# Patient Record
Sex: FEMALE | Race: WHITE
[De-identification: names, ages, dates, MRNs, and addresses within clinical notes are randomized per-mention and may not be internally consistent; named-entity substitution may affect disease eponyms.]

---

## 2018-01-29 ENCOUNTER — HOSPITAL ENCOUNTER (INPATIENT)
Dept: HOSPITAL 14 - H.ER | Age: 28
LOS: 3 days | Discharge: HOME | DRG: 299 | End: 2018-02-01
Attending: INTERNAL MEDICINE | Admitting: INTERNAL MEDICINE
Payer: COMMERCIAL

## 2018-01-29 DIAGNOSIS — R74.0: ICD-10-CM

## 2018-01-29 DIAGNOSIS — I82.4Z2: ICD-10-CM

## 2018-01-29 DIAGNOSIS — D72.829: ICD-10-CM

## 2018-01-29 DIAGNOSIS — I27.20: ICD-10-CM

## 2018-01-29 DIAGNOSIS — I26.99: ICD-10-CM

## 2018-01-29 DIAGNOSIS — I82.432: Primary | ICD-10-CM

## 2018-01-29 DIAGNOSIS — F17.210: ICD-10-CM

## 2018-01-29 DIAGNOSIS — R74.8: ICD-10-CM

## 2018-01-29 DIAGNOSIS — T38.4X5A: ICD-10-CM
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
## 2018-01-29 DIAGNOSIS — Z23: ICD-10-CM

## 2018-01-29 DIAGNOSIS — S86.912A: ICD-10-CM

## 2018-01-29 LAB
ALBUMIN SERPL-MCNC: 4.2 G/DL (ref 3.5–5)
ALBUMIN/GLOB SERPL: 1.1 {RATIO} (ref 1–2.1)
ALT SERPL-CCNC: 118 U/L (ref 9–52)
APTT BLD: 26.2 SECONDS (ref 25.6–37.1)
AST SERPL-CCNC: 143 U/L (ref 14–36)
BASOPHILS # BLD AUTO: 0.1 K/UL (ref 0–0.2)
BASOPHILS NFR BLD: 0.5 % (ref 0–2)
BNP SERPL-MCNC: 1700 PG/ML (ref 0–450)
BUN SERPL-MCNC: 17 MG/DL (ref 7–17)
CALCIUM SERPL-MCNC: 9.6 MG/DL (ref 8.4–10.2)
EOSINOPHIL # BLD AUTO: 0.2 K/UL (ref 0–0.7)
EOSINOPHIL NFR BLD: 1.6 % (ref 0–4)
ERYTHROCYTE [DISTWIDTH] IN BLOOD BY AUTOMATED COUNT: 12.7 % (ref 11.5–14.5)
GFR NON-AFRICAN AMERICAN: 55
HGB BLD-MCNC: 15.1 G/DL (ref 12–16)
INR PPP: 1 (ref 0.9–1.2)
LYMPHOCYTES # BLD AUTO: 2 K/UL (ref 1–4.3)
LYMPHOCYTES NFR BLD AUTO: 14.5 % (ref 20–40)
MCH RBC QN AUTO: 32.2 PG (ref 27–31)
MCHC RBC AUTO-ENTMCNC: 33.6 G/DL (ref 33–37)
MCV RBC AUTO: 95.9 FL (ref 81–99)
MONOCYTES # BLD: 1.2 K/UL (ref 0–0.8)
MONOCYTES NFR BLD: 8.5 % (ref 0–10)
NEUTROPHILS # BLD: 10.3 K/UL (ref 1.8–7)
NEUTROPHILS NFR BLD AUTO: 74.9 % (ref 50–75)
NRBC BLD AUTO-RTO: 0.2 % (ref 0–0)
PLATELET # BLD: 201 K/UL (ref 130–400)
PMV BLD AUTO: 8.1 FL (ref 7.2–11.7)
PROTHROMBIN TIME: 11.3 SECONDS (ref 9.8–13.1)
RBC # BLD AUTO: 4.67 MIL/UL (ref 3.8–5.2)
TROPONIN I SERPL-MCNC: 0.09 NG/ML (ref 0–0.12)
WBC # BLD AUTO: 13.8 K/UL (ref 4.8–10.8)

## 2018-01-29 NOTE — CP.PCM.CON
History of Present Illness





- History of Present Illness


History of Present Illness: 


CC/Reason for ICU: PE with large clot burden





HPI: This is a 26 y/o female with no medical history who came into the ER with c

/o palpitations and SOB, as well as L calf pain. Patient states that she 

initially experienced calf pain about 2 weeks ago. At the time, went to a 

[facility] and was diagnosed with muscular strain and set up with orthopedic f/u. 

For the past two days the pain has become worse, and then the SOB/BAE and 

palpitations came on, and she went back to [facility]; she was sent from there to 

the ED for further w/u out of concern for DVT/PE. She was diagnosed with DVT 

and PE here, and was started on Lovenox. Because her clot burden is rather 

significant, she is being watched in the ICU. Patient with Hx of contraceptive 

use and smoking. Denies f/c/n/v/d. Denies CP at this time.





ROS: 14 systems reviewed, negative other than HPI





MHx/SHx: None





Allergies: None





Medications: oral contraceptive





Family Hx: No family history of DVT, PE





Social Hx: Lives with roommates, occ tobacco, occ EtOH





Past Patient History





- Past Social History


Alcohol: Occasional





- PSYCHIATRIC


Hx Substance Use: No





Meds


Allergies/Adverse Reactions: 


 Allergies











Allergy/AdvReac Type Severity Reaction Status Date / Time


 


No Known Allergies Allergy   Verified 01/29/18 20:27














Results





- Vital Signs


Recent Vital Signs: 


 Last Vital Signs











Temp  98.5 F   01/29/18 19:23


 


Pulse  109 H  01/29/18 22:59


 


Resp  18   01/29/18 22:59


 


BP  138/73   01/29/18 22:59


 


Pulse Ox  94 L  01/29/18 23:06














- Labs


Result Diagrams: 


 01/29/18 20:47





 01/29/18 20:47


Labs: 


 Laboratory Results - last 24 hr











  01/29/18 01/29/18 01/29/18





  20:47 20:47 20:47


 


WBC  13.8 H  


 


RBC  4.67  


 


Hgb  15.1  


 


Hct  44.8  


 


MCV  95.9  


 


MCH  32.2 H  


 


MCHC  33.6  


 


RDW  12.7  


 


Plt Count  201  


 


MPV  8.1  


 


Neut % (Auto)  74.9  


 


Lymph % (Auto)  14.5 L  


 


Mono % (Auto)  8.5  


 


Eos % (Auto)  1.6  


 


Baso % (Auto)  0.5  


 


Neut #  10.3 H  


 


Lymph #  2.0  


 


Mono #  1.2 H  


 


Eos #  0.2  


 


Baso #  0.1  


 


PT   


 


INR   


 


APTT   


 


D-Dimer, Quantitative    3969 H


 


Sodium   140 


 


Potassium   4.3 


 


Chloride   101 


 


Carbon Dioxide   27 


 


Anion Gap   16 


 


BUN   17 


 


Creatinine   1.2 


 


Est GFR ( Amer)   > 60 


 


Est GFR (Non-Af Amer)   55 


 


Random Glucose   101 


 


Calcium   9.6 


 


Total Bilirubin   0.6 


 


AST   143 H 


 


ALT   118 H 


 


Alkaline Phosphatase   74 


 


Troponin I   


 


NT-Pro-B Natriuret Pep   


 


Total Protein   8.1 


 


Albumin   4.2 


 


Globulin   3.9 


 


Albumin/Globulin Ratio   1.1 














  01/29/18 01/29/18





  22:15 22:44


 


WBC  


 


RBC  


 


Hgb  


 


Hct  


 


MCV  


 


MCH  


 


MCHC  


 


RDW  


 


Plt Count  


 


MPV  


 


Neut % (Auto)  


 


Lymph % (Auto)  


 


Mono % (Auto)  


 


Eos % (Auto)  


 


Baso % (Auto)  


 


Neut #  


 


Lymph #  


 


Mono #  


 


Eos #  


 


Baso #  


 


PT  11.3 


 


INR  1.0 


 


APTT  26.2 


 


D-Dimer, Quantitative  


 


Sodium  


 


Potassium  


 


Chloride  


 


Carbon Dioxide  


 


Anion Gap  


 


BUN  


 


Creatinine  


 


Est GFR ( Amer)  


 


Est GFR (Non-Af Amer)  


 


Random Glucose  


 


Calcium  


 


Total Bilirubin  


 


AST  


 


ALT  


 


Alkaline Phosphatase  


 


Troponin I   0.0860


 


NT-Pro-B Natriuret Pep   1700 H


 


Total Protein  


 


Albumin  


 


Globulin  


 


Albumin/Globulin Ratio  














- EKG Data


EKG Interpreted by: Myself


EKG shows normal: Sinus rhythm


Rate: Tachycardia





- EKG Data


EKG comments: 





CHRISS





- Imaging and Cardiology


  ** CT scan - chest


Status: Report reviewed by me (b/l PE;)





Assessment & Plan


(1) Pulmonary embolism


Assessment and Plan: 


26 y/o female with large PE and DVT in setting of OCP and smoking.


-Monitor in ICU overnight


-Continue Lovenox 1 mg/kg q12h


Status: Acute   





(2) Deep vein thrombosis (DVT)


Status: Acute

## 2018-01-29 NOTE — ED PDOC
HPI: Chest Pain


Time Seen by Provider: 01/29/18 20:16


Chief Complaint (Nursing): Palpitations


Chief Complaint (Provider): Palpitations/Left Calf Pain


History Per: Patient


History/Exam Limitations: no limitations


Onset/Duration Of Symptoms: Days (x2)


Current Symptoms Are (Timing): Still Present


Additional Complaint(s): 





Ernestina Peña is a 27 year old [place] female with no past medical history that 

presents to the ED with a chief complaint of palpitations, which she has been 

experiencing for the last two days, and left calf pain, which she has been 

experiencing for the past two weeks. Patient reports that when she initially 

began to experience her calf discomfort 2 weeks ago, she went to a [facility], 

where she states she was diagnosed with a muscular strain of the left calf and 

given a prescription for Naprosyn. She states that she went back to [facility] 

today for a reevaluation because she has now developed palpitations and 

shortness of breath, and her concern was that she might have developed a 

thromboembolic event. [facility] referred her to ED today. Patient denies any 

associated nausea, vomiting, diarrhea, or fever. 





Past Medical History


Reviewed: Historical Data, Nursing Documentation, Vital Signs


Vital Signs: 


 Last Vital Signs











Temp  98.8 F   01/30/18 00:23


 


Pulse  101 H  01/30/18 01:09


 


Resp  18   01/30/18 01:09


 


BP  123/70   01/30/18 00:23


 


Pulse Ox  98   01/30/18 00:23














- Medical History


PMH: No Chronic Diseases





- Surgical History


Surgical History: No Surg Hx





- Family History


Family History: States: Unknown Family Hx





- Social History


Current smoker - smoking cessation education provided: Yes (occasionally)


Alcohol: Occasional





- Home Medications


Home Medications: 


 Ambulatory Orders











 Medication  Instructions  Recorded


 


Ethinyl Estradiol/Drospirenone 1 each PO 01/30/18





[Ocella 3 mg-0.03 mg Tablet]  














- Allergies


Allergies/Adverse Reactions: 


 Allergies











Allergy/AdvReac Type Severity Reaction Status Date / Time


 


No Known Allergies Allergy   Verified 01/29/18 20:27














Review of Systems


Constitutional: Negative for: Fever


Cardiovascular: Positive for: Palpitations


Respiratory: Positive for: Shortness of Breath


Gastrointestinal: Negative for: Nausea, Vomiting, Diarrhea


Musculoskeletal: Positive for: Leg Pain (left calf pain )





Physical Exam





- Reviewed


Nursing Documentation Reviewed: Yes


Vital Signs Reviewed: Yes





- Physical Exam


Appears: Positive for: Non-toxic, No Acute Distress


Head Exam: Positive for: ATRAUMATIC, NORMOCEPHALIC


Skin: Positive for: Normal Color, Warm


Eye Exam: Positive for: Normal appearance, EOMI, PERRL


Neck: Positive for: Normal, Supple


Cardiovascular/Chest: Positive for: Regular Rate, Rhythm.  Negative for: Murmur


Respiratory: Positive for: Normal Breath Sounds.  Negative for: Wheezing


Gastrointestinal/Abdominal: Positive for: Normal Exam, Soft.  Negative for: 

Tenderness


Back: Positive for: Normal Inspection.  Negative for: L CVA Tenderness, R CVA 

Tenderness


Extremity: Positive for: Normal ROM.  Negative for: Tenderness, Pedal Edema, 

Calf Tenderness, Swelling, Other (Negative Jodie's sign left leg)


Neurologic/Psych: Positive for: Alert, Oriented.  Negative for: Motor/Sensory 

Deficits





- Laboratory Results


Result Diagrams: 


 01/29/18 20:47





 01/29/18 20:47





- ECG


O2 Sat by Pulse Oximetry: 94 (RA)


Pulse Ox Interpretation: Normal





- Critical Care


Total Time (In Min): 30





Medical Decision Making


Medical Decision Making: 





Impression: 27 year old [place] female with palpitations, shortness of breath, 

and left calf pain





Plan:


* US Duplex Left Lower Extremity


* EKG


* CMP


* CBC


* D-Dimer


* Urine dip


* Urine preg


* Reevaluation





US Duplex Left Lower Extremity


FINDINGS:


Deep veins: Acute DVT in the left popliteal and within a calf vein.


The remaining visualized deep veins of the left lower extremity are patent.


Superficial veins: Unremarkable. No thrombus in the visualized great saphenous 

vein.


Soft tissues: No acute findings. No popliteal cyst.


IMPRESSION:


Acute DVT in the left popliteal and within a calf vein.





CT Angio Chest ordered.





22:37


CT Angio Chest


FINDINGS:


Pulmonary arteries: Large pulmonary emboli and the left and right pulmonary 

arteries extending into


the segmental branches of all lobes.


Aorta: No acute findings. No thoracic aortic aneurysm.


Lungs: Unremarkable. No mass. No consolidation.


Pleural space: Unremarkable. No significant effusion. No pneumothorax.


Heart: Unremarkable. No cardiomegaly. No significant pericardial effusion. No 

evidence of RV


dysfunction.


Bones/joints: No acute fracture. No dislocation.


Soft tissues: Unremarkable.


Lymph nodes: Unremarkable. No enlarged lymph nodes.


IMPRESSION:


Large pulmonary emboli and the left and right pulmonary arteries extending into 

the


segmental branches of all lobes. Large clot burden. No CT evidence of right 

heart strain.





22:55


Ordered PT, PTT, BNP, troponin, and subcutaneous Lovenox. Spoke to Dr. Mcdermott, 

Pulmonary, and Dr. Gomez, Hematology. Patient will be admitted under the care 

of Dr. Comer. 





Both Dr. Mcdermott and Dr. Gomez agree to management plan. Dr. Gomez feels that 

due to patient's stable condition, benefits do not outweigh risks for TPA at 

this time. Dr. Gomez states that should the patient experience hemodynamic 

instability, he would reconsider. Dr. Mcdermott feels patient should be placed in 

ICU for close monitoring. Case discussed with Dr. Pollock, hospitalist. 





Clinical Diagnosis: DVT and PE


--------------------------------------------------------------------------------

----------------- 


Scribe Attestation:


Documented by Aida Rod, acting as a scribe for Nasim Reece MD.





Provider Scribe Attestation:


All medical record entries made by the Scribe were at my direction and 

personally dictated by me. I have reviewed the chart and agree that the record 

accurately reflects my personal performance of the history, physical exam, 

medical decision making, and the department course for this patient. I have 

also personally directed, reviewed, and agree with the discharge instructions 

and disposition.








Disposition





- Clinical Impression


Clinical Impression: 


 Pulmonary embolism, Deep vein thrombosis (DVT)








- Patient ED Disposition


Is Patient to be Admitted: Yes


Discussed With : Emma Pollock (Dr Mcdermott/Jason/Souleymane)


Counseled Patient/Family Regarding: Studies Performed, Diagnosis





- Disposition


Disposition Time: 22:55


Condition: GUARDED


Patient Signed Over To: Sunny Comer


Present On Arrival: Deep Vein Thrombosis / PE

## 2018-01-30 LAB
BUN SERPL-MCNC: 15 MG/DL (ref 7–17)
CALCIUM SERPL-MCNC: 9.3 MG/DL (ref 8.4–10.2)
ERYTHROCYTE [DISTWIDTH] IN BLOOD BY AUTOMATED COUNT: 12.5 % (ref 11.5–14.5)
GFR NON-AFRICAN AMERICAN: > 60
HGB BLD-MCNC: 14.1 G/DL (ref 12–16)
MCH RBC QN AUTO: 32.2 PG (ref 27–31)
MCHC RBC AUTO-ENTMCNC: 33.6 G/DL (ref 33–37)
MCV RBC AUTO: 95.8 FL (ref 81–99)
PLATELET # BLD: 199 K/UL (ref 130–400)
RBC # BLD AUTO: 4.38 MIL/UL (ref 3.8–5.2)
WBC # BLD AUTO: 12.9 K/UL (ref 4.8–10.8)

## 2018-01-30 PROCEDURE — 3E0234Z INTRODUCTION OF SERUM, TOXOID AND VACCINE INTO MUSCLE, PERCUTANEOUS APPROACH: ICD-10-PCS | Performed by: INTERNAL MEDICINE

## 2018-01-30 RX ADMIN — ENOXAPARIN SODIUM SCH MG: 80 INJECTION SUBCUTANEOUS at 21:38

## 2018-01-30 RX ADMIN — ENOXAPARIN SODIUM SCH MG: 80 INJECTION SUBCUTANEOUS at 09:24

## 2018-01-30 NOTE — CT
PROCEDURE:  CT Chest with contrast (Pulmonary Angiogram)



HISTORY:

Chest pain, r/o PE



COMPARISON:

None available. 



TECHNIQUE:

Axial computed tomography images were obtained of the chest in the 

pulmonary arterial phase of enhancement. Coronal and sagittal 

reformatted images were created and reviewed.



Intravenous contrast dose: 90 mL Visipaque 320



Radiation dose:



Total exam DLP = 361.79 mGy-cm.



This CT exam was performed using one or more of the following dose 

reduction techniques: Automated exposure control, adjustment of the 

mA and/or kV according to patient size, and/or use of iterative 

reconstruction technique.



FINDINGS:



PULMONARY ARTERIES:

There are large filling defects in the distal right and left 

pulmonary arteries extending to the lobar and segmental branches of 

all lobes. 



AORTA:

No aortic aneurysm or dissection. 



LUNGS:

The lungs are well inflated and clear. No nodule, mass or pulmonary 

consolidation. 



PLEURAL SPACES:

No pleural effusions or pneumothorax. 



HEART:

No cardiomegaly. No significant pericardial effusion. 



LYMPH NODES:

No pathologic lymphadenopathy.



BONES, CHEST WALL:

Within normal limits for the patient's age. No fracture or 

destructive lesion 



OTHER FINDINGS:

Unremarkable. 



IMPRESSION:

Acute large clot burden pulmonary embolism in the right and left 

pulmonary arteries extending into the lobar and segmental branches. 

No evidence of right ventricular strain. 



A preliminary report was provided by [facility].

## 2018-01-30 NOTE — CP.[email or web address]
Addendum entered and electronically signed by Dulce White MD  01/30/18 13:

53: 





Echo results : LVEF : 60-65% 


           Right ventricle mild to moderately dilated.


           Mild to moderate pulmonary hypertension.





Original Note:








<Dulce White - Last Filed: 01/30/18 11:12>





CCU Subjective





- Physician Review


Subjective (Free Text): 





01/30/18 11:13


Patient seen lying in bed, resting comfortably with some shortness of breath, 

but no distress. 


Some pain in her leg, with walking.


Sinus tachycardia on monitor: HR 100s, on O2 via NC. 


She has been on OCPs for about 6 months. 


She reports being occasional smoker, maybe up to 3 cigarettes per week. 


Very physically active, does crossfit regularly. 


No personal or family history of DVT/PE.











CCU Objective





- Vital Signs / Intake & Output


Vital Signs (Last 4 hours): 


Vital Signs











  Temp Pulse Resp BP Pulse Ox


 


 01/30/18 08:00  98.2 F  97 H  14  116/72  99











Intake and Output (Last 8hrs): 


 Intake & Output











 01/29/18 01/30/18 01/30/18





 22:59 06:59 14:59


 


Intake Total  0 


 


Balance  0 


 


Weight 70.307 kg  


 


Intake:   


 


  IV  0 














- Physical Exam


Head: Positive for: Atraumatic, Normocephalic


Pupils: Positive for: PERRL


Extroacular Muscles: Positive for: EOMI


Mouth: Positive for: Moist Mucous Membranes


Neck: Positive for: Normal Range of Motion


Respiratory/Chest: Positive for: Good Air Exchange.  Negative for: Respiratory 

Distress, Accessory Muscle Use


Cardiovascular: Positive for: Regular Rate and Rhythm, Tachycardic


Abdomen: Negative for: Tenderness, Distention, Normal Bowel Sounds


Upper Extremity: Positive for: Normal Inspection


Lower Extremity: Positive for: Normal Inspection.  Negative for: Edema


Neurological: Positive for: GCS=15, Speech Normal


Skin: Positive for: Warm, Dry, Normal Color.  Negative for: Rashes


Psychiatric: Positive for: Alert, Oriented x 3, Normal Insight, Normal 

Concentration





- Medications


Active Medications: 


Active Medications











Generic Name Dose Route Start Last Admin





  Trade Name Freq  PRN Reason Stop Dose Admin


 


Enoxaparin Sodium  70 mg  01/30/18 09:00  01/30/18 09:24





  Lovenox  SC   70 mg





  Q12 NNEKA   Administration





  Protocol   














- Patient Studies


Lab Studies: 


 Lab Studies











  01/30/18 01/30/18 01/29/18 Range/Units





  06:00 04:30 22:44 


 


WBC  12.9 H    (4.8-10.8)  K/uL


 


RBC  4.38    (3.80-5.20)  Mil/uL


 


Hgb  14.1    (12.0-16.0)  g/dL


 


Hct  42.0    (34.0-47.0)  %


 


MCV  95.8    (81.0-99.0)  fl


 


MCH  32.2 H    (27.0-31.0)  pg


 


MCHC  33.6    (33.0-37.0)  g/dL


 


RDW  12.5    (11.5-14.5)  %


 


Plt Count  199    (130-400)  K/uL


 


MPV     (7.2-11.7)  fl


 


Neut % (Auto)     (50.0-75.0)  %


 


Lymph % (Auto)     (20.0-40.0)  %


 


Mono % (Auto)     (0.0-10.0)  %


 


Eos % (Auto)     (0.0-4.0)  %


 


Baso % (Auto)     (0.0-2.0)  %


 


Neut #     (1.8-7.0)  K/uL


 


Lymph #     (1.0-4.3)  K/uL


 


Mono #     (0.0-0.8)  K/uL


 


Eos #     (0.0-0.7)  K/uL


 


Baso #     (0.0-0.2)  K/uL


 


PT     (9.8-13.1)  Seconds


 


INR     (0.9-1.2)  


 


APTT     (25.6-37.1)  Seconds


 


D-Dimer, Quantitative     (0-230)  ng/mlDDU


 


Sodium   139   (132-148)  mmol/l


 


Potassium   3.9   (3.6-5.0)  MMOL/L


 


Chloride   104   ()  mmol/L


 


Carbon Dioxide   25   (22-30)  mmol/L


 


Anion Gap   14   (10-20)  


 


BUN   15   (7-17)  mg/dl


 


Creatinine   1.1   (0.7-1.2)  mg/dl


 


Est GFR (African Amer)   > 60   


 


Est GFR (Non-Af Amer)   > 60   


 


Random Glucose   103   ()  mg/dL


 


Calcium   9.3   (8.4-10.2)  mg/dL


 


Total Bilirubin     (0.2-1.3)  mg/dl


 


AST     (14-36)  U/L


 


ALT     (9-52)  U/L


 


Alkaline Phosphatase     ()  U/L


 


Troponin I    0.0860  (0.00-0.120)  ng/mL


 


NT-Pro-B Natriuret Pep    1700 H  (0-450)  pg/ml


 


Total Protein     (6.3-8.2)  G/DL


 


Albumin     (3.5-5.0)  g/dL


 


Globulin     (2.2-3.9)  gm/dL


 


Albumin/Globulin Ratio     (1.0-2.1)  














  01/29/18 01/29/18 01/29/18 Range/Units





  22:15 20:47 20:47 


 


WBC     (4.8-10.8)  K/uL


 


RBC     (3.80-5.20)  Mil/uL


 


Hgb     (12.0-16.0)  g/dL


 


Hct     (34.0-47.0)  %


 


MCV     (81.0-99.0)  fl


 


MCH     (27.0-31.0)  pg


 


MCHC     (33.0-37.0)  g/dL


 


RDW     (11.5-14.5)  %


 


Plt Count     (130-400)  K/uL


 


MPV     (7.2-11.7)  fl


 


Neut % (Auto)     (50.0-75.0)  %


 


Lymph % (Auto)     (20.0-40.0)  %


 


Mono % (Auto)     (0.0-10.0)  %


 


Eos % (Auto)     (0.0-4.0)  %


 


Baso % (Auto)     (0.0-2.0)  %


 


Neut #     (1.8-7.0)  K/uL


 


Lymph #     (1.0-4.3)  K/uL


 


Mono #     (0.0-0.8)  K/uL


 


Eos #     (0.0-0.7)  K/uL


 


Baso #     (0.0-0.2)  K/uL


 


PT  11.3    (9.8-13.1)  Seconds


 


INR  1.0    (0.9-1.2)  


 


APTT  26.2    (25.6-37.1)  Seconds


 


D-Dimer, Quantitative   3969 H   (0-230)  ng/mlDDU


 


Sodium    140  (132-148)  mmol/l


 


Potassium    4.3  (3.6-5.0)  MMOL/L


 


Chloride    101  ()  mmol/L


 


Carbon Dioxide    27  (22-30)  mmol/L


 


Anion Gap    16  (10-20)  


 


BUN    17  (7-17)  mg/dl


 


Creatinine    1.2  (0.7-1.2)  mg/dl


 


Est GFR (African Amer)    > 60  


 


Est GFR (Non-Af Amer)    55  


 


Random Glucose    101  ()  mg/dL


 


Calcium    9.6  (8.4-10.2)  mg/dL


 


Total Bilirubin    0.6  (0.2-1.3)  mg/dl


 


AST    143 H  (14-36)  U/L


 


ALT    118 H  (9-52)  U/L


 


Alkaline Phosphatase    74  ()  U/L


 


Troponin I     (0.00-0.120)  ng/mL


 


NT-Pro-B Natriuret Pep     (0-450)  pg/ml


 


Total Protein    8.1  (6.3-8.2)  G/DL


 


Albumin    4.2  (3.5-5.0)  g/dL


 


Globulin    3.9  (2.2-3.9)  gm/dL


 


Albumin/Globulin Ratio    1.1  (1.0-2.1)  














  01/29/18 Range/Units





  20:47 


 


WBC  13.8 H  (4.8-10.8)  K/uL


 


RBC  4.67  (3.80-5.20)  Mil/uL


 


Hgb  15.1  (12.0-16.0)  g/dL


 


Hct  44.8  (34.0-47.0)  %


 


MCV  95.9  (81.0-99.0)  fl


 


MCH  32.2 H  (27.0-31.0)  pg


 


MCHC  33.6  (33.0-37.0)  g/dL


 


RDW  12.7  (11.5-14.5)  %


 


Plt Count  201  (130-400)  K/uL


 


MPV  8.1  (7.2-11.7)  fl


 


Neut % (Auto)  74.9  (50.0-75.0)  %


 


Lymph % (Auto)  14.5 L  (20.0-40.0)  %


 


Mono % (Auto)  8.5  (0.0-10.0)  %


 


Eos % (Auto)  1.6  (0.0-4.0)  %


 


Baso % (Auto)  0.5  (0.0-2.0)  %


 


Neut #  10.3 H  (1.8-7.0)  K/uL


 


Lymph #  2.0  (1.0-4.3)  K/uL


 


Mono #  1.2 H  (0.0-0.8)  K/uL


 


Eos #  0.2  (0.0-0.7)  K/uL


 


Baso #  0.1  (0.0-0.2)  K/uL


 


PT   (9.8-13.1)  Seconds


 


INR   (0.9-1.2)  


 


APTT   (25.6-37.1)  Seconds


 


D-Dimer, Quantitative   (0-230)  ng/mlDDU


 


Sodium   (132-148)  mmol/l


 


Potassium   (3.6-5.0)  MMOL/L


 


Chloride   ()  mmol/L


 


Carbon Dioxide   (22-30)  mmol/L


 


Anion Gap   (10-20)  


 


BUN   (7-17)  mg/dl


 


Creatinine   (0.7-1.2)  mg/dl


 


Est GFR (African Amer)   


 


Est GFR (Non-Af Amer)   


 


Random Glucose   ()  mg/dL


 


Calcium   (8.4-10.2)  mg/dL


 


Total Bilirubin   (0.2-1.3)  mg/dl


 


AST   (14-36)  U/L


 


ALT   (9-52)  U/L


 


Alkaline Phosphatase   ()  U/L


 


Troponin I   (0.00-0.120)  ng/mL


 


NT-Pro-B Natriuret Pep   (0-450)  pg/ml


 


Total Protein   (6.3-8.2)  G/DL


 


Albumin   (3.5-5.0)  g/dL


 


Globulin   (2.2-3.9)  gm/dL


 


Albumin/Globulin Ratio   (1.0-2.1)  








 Laboratory Results - last 24 hr











  01/29/18 01/29/18 01/29/18





  20:47 20:47 20:47


 


WBC  13.8 H  


 


RBC  4.67  


 


Hgb  15.1  


 


Hct  44.8  


 


MCV  95.9  


 


MCH  32.2 H  


 


MCHC  33.6  


 


RDW  12.7  


 


Plt Count  201  


 


MPV  8.1  


 


Neut % (Auto)  74.9  


 


Lymph % (Auto)  14.5 L  


 


Mono % (Auto)  8.5  


 


Eos % (Auto)  1.6  


 


Baso % (Auto)  0.5  


 


Neut #  10.3 H  


 


Lymph #  2.0  


 


Mono #  1.2 H  


 


Eos #  0.2  


 


Baso #  0.1  


 


PT   


 


INR   


 


APTT   


 


D-Dimer, Quantitative    3969 H


 


Sodium   140 


 


Potassium   4.3 


 


Chloride   101 


 


Carbon Dioxide   27 


 


Anion Gap   16 


 


BUN   17 


 


Creatinine   1.2 


 


Est GFR ( Amer)   > 60 


 


Est GFR (Non-Af Amer)   55 


 


Random Glucose   101 


 


Calcium   9.6 


 


Total Bilirubin   0.6 


 


AST   143 H 


 


ALT   118 H 


 


Alkaline Phosphatase   74 


 


Troponin I   


 


NT-Pro-B Natriuret Pep   


 


Total Protein   8.1 


 


Albumin   4.2 


 


Globulin   3.9 


 


Albumin/Globulin Ratio   1.1 














  01/29/18 01/29/18 01/30/18





  22:15 22:44 04:30


 


WBC   


 


RBC   


 


Hgb   


 


Hct   


 


MCV   


 


MCH   


 


MCHC   


 


RDW   


 


Plt Count   


 


MPV   


 


Neut % (Auto)   


 


Lymph % (Auto)   


 


Mono % (Auto)   


 


Eos % (Auto)   


 


Baso % (Auto)   


 


Neut #   


 


Lymph #   


 


Mono #   


 


Eos #   


 


Baso #   


 


PT  11.3  


 


INR  1.0  


 


APTT  26.2  


 


D-Dimer, Quantitative   


 


Sodium    139


 


Potassium    3.9


 


Chloride    104


 


Carbon Dioxide    25


 


Anion Gap    14


 


BUN    15


 


Creatinine    1.1


 


Est GFR ( Amer)    > 60


 


Est GFR (Non-Af Amer)    > 60


 


Random Glucose    103


 


Calcium    9.3


 


Total Bilirubin   


 


AST   


 


ALT   


 


Alkaline Phosphatase   


 


Troponin I   0.0860 


 


NT-Pro-B Natriuret Pep   1700 H 


 


Total Protein   


 


Albumin   


 


Globulin   


 


Albumin/Globulin Ratio   














  01/30/18





  06:00


 


WBC  12.9 H


 


RBC  4.38


 


Hgb  14.1


 


Hct  42.0


 


MCV  95.8


 


MCH  32.2 H


 


MCHC  33.6


 


RDW  12.5


 


Plt Count  199


 


MPV 


 


Neut % (Auto) 


 


Lymph % (Auto) 


 


Mono % (Auto) 


 


Eos % (Auto) 


 


Baso % (Auto) 


 


Neut # 


 


Lymph # 


 


Mono # 


 


Eos # 


 


Baso # 


 


PT 


 


INR 


 


APTT 


 


D-Dimer, Quantitative 


 


Sodium 


 


Potassium 


 


Chloride 


 


Carbon Dioxide 


 


Anion Gap 


 


BUN 


 


Creatinine 


 


Est GFR ( Amer) 


 


Est GFR (Non-Af Amer) 


 


Random Glucose 


 


Calcium 


 


Total Bilirubin 


 


AST 


 


ALT 


 


Alkaline Phosphatase 


 


Troponin I 


 


NT-Pro-B Natriuret Pep 


 


Total Protein 


 


Albumin 


 


Globulin 


 


Albumin/Globulin Ratio 











EKG/Cardiology Studies: 


Cardiology / EKG Studies





01/29/18 20:27


ELECTROCARDIOGRAM Stat 


   Comment: 


   Mode Of Transportation: PORTABLE


   Reason For Exam: CP














Critical Care Progress Note





- Nutrition


Nutrition: 


 Nutrition











 Category Date Time Status


 


 Regular Diet [DIET] Diets  01/29/18 Breakfast Active














Assessment/Plan





- Assessment and Plan (Free Text)


Assessment: 


27 year old female with no PMH admitted for DVT and multiple bilateral 

pulmonary emboli while on OCPs.


Patient without personal or family history of DVT/PE, needs further workup. 





Assessment:


1. Deep Venous Thrombosis with Bilateral Pulmonary Emobli in setting of OCP use 

and occasional tobacco use.


2.  Transaminitis, etiology unknown, ?hypercoaguable state





Plan: 


-ICU monitoring for large clot burden, O2 prn dyspnea/ destaturation.


-Therapeutic Anticoagulation with Lovenox 70mg q12 hrs


-Hypercoaguability workup ordered: protein C&S, antithrombin 3, factor V leiden

, prothrombin gene, antiphospholipid sdx, MTHFR


-Obtain echo to evaluate for right heart strain, troponins negative.


-Repeat CMP in AM, monitor LFTs


-Pulmonary and Hematology/Oncology consults





Case d/w intesivist.





Christopher PGY2





<Puma Paz V - Last Filed: 01/30/18 14:23>





CCU Subjective





- Physician Review


Events Since Last Encounter (Free Text): 





01/30/18 14:22


Patient is seen, examined at bedside. Case discussed in am rounds. agree with 

plan of care as detailed in resident's note





CCU Objective





- Vital Signs / Intake & Output


Vital Signs (Last 4 hours): 


Vital Signs











  Temp Pulse Resp BP Pulse Ox


 


 01/30/18 14:00   106 H  23  108/79  96


 


 01/30/18 12:00  97.4 F L  105 H  12  109/51 L  92 L











Intake and Output (Last 8hrs): 


 Intake & Output











 01/29/18 01/30/18 01/30/18





 22:59 06:59 14:59


 


Intake Total  0 


 


Balance  0 


 


Weight 155 lb  


 


Intake:   


 


  IV  0 














- Medications


Active Medications: 


Active Medications











Generic Name Dose Route Start Last Admin





  Trade Name Freq  PRN Reason Stop Dose Admin


 


Enoxaparin Sodium  70 mg  01/30/18 09:00  01/30/18 09:24





  Lovenox  SC   70 mg





  Q12 NNEKA   Administration





  Protocol   














- Patient Studies


Lab Studies: 


 Lab Studies











  01/30/18 01/30/18 01/29/18 Range/Units





  06:00 04:30 22:44 


 


WBC  12.9 H    (4.8-10.8)  K/uL


 


RBC  4.38    (3.80-5.20)  Mil/uL


 


Hgb  14.1    (12.0-16.0)  g/dL


 


Hct  42.0    (34.0-47.0)  %


 


MCV  95.8    (81.0-99.0)  fl


 


MCH  32.2 H    (27.0-31.0)  pg


 


MCHC  33.6    (33.0-37.0)  g/dL


 


RDW  12.5    (11.5-14.5)  %


 


Plt Count  199    (130-400)  K/uL


 


MPV     (7.2-11.7)  fl


 


Neut % (Auto)     (50.0-75.0)  %


 


Lymph % (Auto)     (20.0-40.0)  %


 


Mono % (Auto)     (0.0-10.0)  %


 


Eos % (Auto)     (0.0-4.0)  %


 


Baso % (Auto)     (0.0-2.0)  %


 


Neut #     (1.8-7.0)  K/uL


 


Lymph #     (1.0-4.3)  K/uL


 


Mono #     (0.0-0.8)  K/uL


 


Eos #     (0.0-0.7)  K/uL


 


Baso #     (0.0-0.2)  K/uL


 


PT     (9.8-13.1)  Seconds


 


INR     (0.9-1.2)  


 


APTT     (25.6-37.1)  Seconds


 


D-Dimer, Quantitative     (0-230)  ng/mlDDU


 


Sodium   139   (132-148)  mmol/l


 


Potassium   3.9   (3.6-5.0)  MMOL/L


 


Chloride   104   ()  mmol/L


 


Carbon Dioxide   25   (22-30)  mmol/L


 


Anion Gap   14   (10-20)  


 


BUN   15   (7-17)  mg/dl


 


Creatinine   1.1   (0.7-1.2)  mg/dl


 


Est GFR (African Amer)   > 60   


 


Est GFR (Non-Af Amer)   > 60   


 


Random Glucose   103   ()  mg/dL


 


Calcium   9.3   (8.4-10.2)  mg/dL


 


Total Bilirubin     (0.2-1.3)  mg/dl


 


AST     (14-36)  U/L


 


ALT     (9-52)  U/L


 


Alkaline Phosphatase     ()  U/L


 


Troponin I    0.0860  (0.00-0.120)  ng/mL


 


NT-Pro-B Natriuret Pep    1700 H  (0-450)  pg/ml


 


Total Protein     (6.3-8.2)  G/DL


 


Albumin     (3.5-5.0)  g/dL


 


Globulin     (2.2-3.9)  gm/dL


 


Albumin/Globulin Ratio     (1.0-2.1)  














  01/29/18 01/29/18 01/29/18 Range/Units





  22:15 20:47 20:47 


 


WBC     (4.8-10.8)  K/uL


 


RBC     (3.80-5.20)  Mil/uL


 


Hgb     (12.0-16.0)  g/dL


 


Hct     (34.0-47.0)  %


 


MCV     (81.0-99.0)  fl


 


MCH     (27.0-31.0)  pg


 


MCHC     (33.0-37.0)  g/dL


 


RDW     (11.5-14.5)  %


 


Plt Count     (130-400)  K/uL


 


MPV     (7.2-11.7)  fl


 


Neut % (Auto)     (50.0-75.0)  %


 


Lymph % (Auto)     (20.0-40.0)  %


 


Mono % (Auto)     (0.0-10.0)  %


 


Eos % (Auto)     (0.0-4.0)  %


 


Baso % (Auto)     (0.0-2.0)  %


 


Neut #     (1.8-7.0)  K/uL


 


Lymph #     (1.0-4.3)  K/uL


 


Mono #     (0.0-0.8)  K/uL


 


Eos #     (0.0-0.7)  K/uL


 


Baso #     (0.0-0.2)  K/uL


 


PT  11.3    (9.8-13.1)  Seconds


 


INR  1.0    (0.9-1.2)  


 


APTT  26.2    (25.6-37.1)  Seconds


 


D-Dimer, Quantitative   3969 H   (0-230)  ng/mlDDU


 


Sodium    140  (132-148)  mmol/l


 


Potassium    4.3  (3.6-5.0)  MMOL/L


 


Chloride    101  ()  mmol/L


 


Carbon Dioxide    27  (22-30)  mmol/L


 


Anion Gap    16  (10-20)  


 


BUN    17  (7-17)  mg/dl


 


Creatinine    1.2  (0.7-1.2)  mg/dl


 


Est GFR (African Amer)    > 60  


 


Est GFR (Non-Af Amer)    55  


 


Random Glucose    101  ()  mg/dL


 


Calcium    9.6  (8.4-10.2)  mg/dL


 


Total Bilirubin    0.6  (0.2-1.3)  mg/dl


 


AST    143 H  (14-36)  U/L


 


ALT    118 H  (9-52)  U/L


 


Alkaline Phosphatase    74  ()  U/L


 


Troponin I     (0.00-0.120)  ng/mL


 


NT-Pro-B Natriuret Pep     (0-450)  pg/ml


 


Total Protein    8.1  (6.3-8.2)  G/DL


 


Albumin    4.2  (3.5-5.0)  g/dL


 


Globulin    3.9  (2.2-3.9)  gm/dL


 


Albumin/Globulin Ratio    1.1  (1.0-2.1)  














  01/29/18 Range/Units





  20:47 


 


WBC  13.8 H  (4.8-10.8)  K/uL


 


RBC  4.67  (3.80-5.20)  Mil/uL


 


Hgb  15.1  (12.0-16.0)  g/dL


 


Hct  44.8  (34.0-47.0)  %


 


MCV  95.9  (81.0-99.0)  fl


 


MCH  32.2 H  (27.0-31.0)  pg


 


MCHC  33.6  (33.0-37.0)  g/dL


 


RDW  12.7  (11.5-14.5)  %


 


Plt Count  201  (130-400)  K/uL


 


MPV  8.1  (7.2-11.7)  fl


 


Neut % (Auto)  74.9  (50.0-75.0)  %


 


Lymph % (Auto)  14.5 L  (20.0-40.0)  %


 


Mono % (Auto)  8.5  (0.0-10.0)  %


 


Eos % (Auto)  1.6  (0.0-4.0)  %


 


Baso % (Auto)  0.5  (0.0-2.0)  %


 


Neut #  10.3 H  (1.8-7.0)  K/uL


 


Lymph #  2.0  (1.0-4.3)  K/uL


 


Mono #  1.2 H  (0.0-0.8)  K/uL


 


Eos #  0.2  (0.0-0.7)  K/uL


 


Baso #  0.1  (0.0-0.2)  K/uL


 


PT   (9.8-13.1)  Seconds


 


INR   (0.9-1.2)  


 


APTT   (25.6-37.1)  Seconds


 


D-Dimer, Quantitative   (0-230)  ng/mlDDU


 


Sodium   (132-148)  mmol/l


 


Potassium   (3.6-5.0)  MMOL/L


 


Chloride   ()  mmol/L


 


Carbon Dioxide   (22-30)  mmol/L


 


Anion Gap   (10-20)  


 


BUN   (7-17)  mg/dl


 


Creatinine   (0.7-1.2)  mg/dl


 


Est GFR (African Amer)   


 


Est GFR (Non-Af Amer)   


 


Random Glucose   ()  mg/dL


 


Calcium   (8.4-10.2)  mg/dL


 


Total Bilirubin   (0.2-1.3)  mg/dl


 


AST   (14-36)  U/L


 


ALT   (9-52)  U/L


 


Alkaline Phosphatase   ()  U/L


 


Troponin I   (0.00-0.120)  ng/mL


 


NT-Pro-B Natriuret Pep   (0-450)  pg/ml


 


Total Protein   (6.3-8.2)  G/DL


 


Albumin   (3.5-5.0)  g/dL


 


Globulin   (2.2-3.9)  gm/dL


 


Albumin/Globulin Ratio   (1.0-2.1)  








 Laboratory Results - last 24 hr











  01/29/18 01/29/18 01/29/18





  20:47 20:47 20:47


 


WBC  13.8 H  


 


RBC  4.67  


 


Hgb  15.1  


 


Hct  44.8  


 


MCV  95.9  


 


MCH  32.2 H  


 


MCHC  33.6  


 


RDW  12.7  


 


Plt Count  201  


 


MPV  8.1  


 


Neut % (Auto)  74.9  


 


Lymph % (Auto)  14.5 L  


 


Mono % (Auto)  8.5  


 


Eos % (Auto)  1.6  


 


Baso % (Auto)  0.5  


 


Neut #  10.3 H  


 


Lymph #  2.0  


 


Mono #  1.2 H  


 


Eos #  0.2  


 


Baso #  0.1  


 


PT   


 


INR   


 


APTT   


 


D-Dimer, Quantitative    3969 H


 


Sodium   140 


 


Potassium   4.3 


 


Chloride   101 


 


Carbon Dioxide   27 


 


Anion Gap   16 


 


BUN   17 


 


Creatinine   1.2 


 


Est GFR ( Amer)   > 60 


 


Est GFR (Non-Af Amer)   55 


 


Random Glucose   101 


 


Calcium   9.6 


 


Total Bilirubin   0.6 


 


AST   143 H 


 


ALT   118 H 


 


Alkaline Phosphatase   74 


 


Troponin I   


 


NT-Pro-B Natriuret Pep   


 


Total Protein   8.1 


 


Albumin   4.2 


 


Globulin   3.9 


 


Albumin/Globulin Ratio   1.1 














  01/29/18 01/29/18 01/30/18





  22:15 22:44 04:30


 


WBC   


 


RBC   


 


Hgb   


 


Hct   


 


MCV   


 


MCH   


 


MCHC   


 


RDW   


 


Plt Count   


 


MPV   


 


Neut % (Auto)   


 


Lymph % (Auto)   


 


Mono % (Auto)   


 


Eos % (Auto)   


 


Baso % (Auto)   


 


Neut #   


 


Lymph #   


 


Mono #   


 


Eos #   


 


Baso #   


 


PT  11.3  


 


INR  1.0  


 


APTT  26.2  


 


D-Dimer, Quantitative   


 


Sodium    139


 


Potassium    3.9


 


Chloride    104


 


Carbon Dioxide    25


 


Anion Gap    14


 


BUN    15


 


Creatinine    1.1


 


Est GFR ( Amer)    > 60


 


Est GFR (Non-Af Amer)    > 60


 


Random Glucose    103


 


Calcium    9.3


 


Total Bilirubin   


 


AST   


 


ALT   


 


Alkaline Phosphatase   


 


Troponin I   0.0860 


 


NT-Pro-B Natriuret Pep   1700 H 


 


Total Protein   


 


Albumin   


 


Globulin   


 


Albumin/Globulin Ratio   














  01/30/18





  06:00


 


WBC  12.9 H


 


RBC  4.38


 


Hgb  14.1


 


Hct  42.0


 


MCV  95.8


 


MCH  32.2 H


 


MCHC  33.6


 


RDW  12.5


 


Plt Count  199


 


MPV 


 


Neut % (Auto) 


 


Lymph % (Auto) 


 


Mono % (Auto) 


 


Eos % (Auto) 


 


Baso % (Auto) 


 


Neut # 


 


Lymph # 


 


Mono # 


 


Eos # 


 


Baso # 


 


PT 


 


INR 


 


APTT 


 


D-Dimer, Quantitative 


 


Sodium 


 


Potassium 


 


Chloride 


 


Carbon Dioxide 


 


Anion Gap 


 


BUN 


 


Creatinine 


 


Est GFR ( Amer) 


 


Est GFR (Non-Af Amer) 


 


Random Glucose 


 


Calcium 


 


Total Bilirubin 


 


AST 


 


ALT 


 


Alkaline Phosphatase 


 


Troponin I 


 


NT-Pro-B Natriuret Pep 


 


Total Protein 


 


Albumin 


 


Globulin 


 


Albumin/Globulin Ratio 











EKG/Cardiology Studies: 


Cardiology / EKG Studies





01/29/18 20:27


ELECTROCARDIOGRAM Stat 


   Comment: 


   Mode Of Transportation: PORTABLE


   Reason For Exam: CP














Critical Care Progress Note





- Nutrition


Nutrition: 


 Nutrition











 Category Date Time Status


 


 Regular Diet [DIET] Diets  01/29/18 Breakfast Active

## 2018-01-30 NOTE — RAD
HISTORY:

Chest pain.



COMPARISON:

January 29, 2018. CT angiogram documenting bilateral pulmonary 

emboli. 



FINDINGS:



LUNGS:

No active pulmonary disease.



PLEURA:

No significant pleural effusion identified, no pneumothorax apparent.



CARDIOVASCULAR:

Normal.



OSSEOUS STRUCTURES:

No significant abnormalities.



VISUALIZED UPPER ABDOMEN:

Normal.



OTHER FINDINGS:

None.



IMPRESSION:

No active disease.

## 2018-01-30 NOTE — HP
CHIEF COMPLAINT:  Shortness of breath.



HISTORY OF PRESENT ILLNESS:  This is a 27-year-old female without

significant past medical history, who was on birth control pill, was

feeling bad and was having leg pain.  _____ the patient went to see the MD

where the patient was treated for muscle cramps.  The patient did not

improve and started having palpitation and shortness of breath, so the

patient was sent back to see the MD and was sent to the emergency room for

further management.  While in the emergency room, after workup, the patient

was found to have pulmonary embolism and was admitted for further

management.



REVIEW OF SYSTEMS:  Positive for leg pain, palpitation and shortness of

breath.  Review of systems, otherwise, is negative for headache, dizziness,

syncope, loss of consciousness, nausea, vomiting, diarrhea, constipation. 

Review of systems of all other organ system is unremarkable.



PAST MEDICAL HISTORY:  Unremarkable.



PAST SURGICAL HISTORY:  Unremarkable.



PERSONAL HISTORY:  The patient is currently nonsmoker, nondrinker.  No

substance abuse.



MEDICATIONS:  The patient is on birth control pill, Ocella.



ALLERGIES:  THE PATIENT IS NOT ALLERGIC TO ANY MEDICATION.



FAMILY HISTORY:  Noncontributory.



PHYSICAL EXAMINATION:

GENERAL:  Well-built, well-nourished, slightly overweight 27-year-old young

female, in no acute distress.

VITAL SIGNS:  Temperature 99.3, pulse 100, respirations 16, blood pressure

110/82.

HEENT:  Pupils reacting to light.  No JVD.  No thyromegaly.  No

lymphadenopathy.  No nystagmus.  Normocephalic, atraumatic skull.

HEART:  S1, S2 normal and regular.  No significant murmur, gallop, or rub

is heard.

LUNGS:  Show good bilateral air exchange.  No rales or rhonchi.

ABDOMEN:  Soft, nontender.  No organomegaly.  No fluid.  Bowel sounds are

plus and normal.

EXTREMITIES:  The patient has left lower extremity slightly enlarged, but

no calf swelling.  No tenderness.  No acute ischemia.

CENTRAL NERVOUS SYSTEM:  The patient is alert, awake, oriented x3.  There

is no sign of any acute gross focal motor or sensory neurological deficit.



DIAGNOSTIC DATA:  Available diagnostic data reviewed.  Telemetry monitoring

does not reveal significant arrhythmias.  WBC 13.8, hemoglobin 15.1,

hematocrit 44.8, platelets 201,000.  D-dimer is 3969.  Sodium _____,

potassium 4.2, chloride 101, bicarb 27, BUN 17, creatinine 1.2.  AST is

143, ALT is 118.  Troponin level is 0.08.  ProBNP level is 1700.  CTA is

positive for pulmonary embolism.  Left _____ ultrasound is positive for

DVT.



ADMITTING IMPRESSION:  Pulmonary embolism, left leg deep venous thrombosis,

elevated liver enzymes.



PLAN:  As ordered.



Case and plan discussed with the patient.











__________________________________________

Sunny Comer MD





DD:  01/30/2018 6:39:48

DT:  01/30/2018 7:49:18

Job # [id number]

## 2018-01-30 NOTE — CARD
--------------- APPROVED REPORT --------------





EXAM: Two-dimensional and M-mode echocardiogram with Doppler and 

color Doppler.



Other Information 

Quality : GoodRhythm : Tachycardia



INDICATION

Pulmonary Embolism 



2D DIMENSIONS 

IVSd1.57   (0.7-1.1cm)LVDd3.40   (3.9-5.9cm)

LVOT Diameter2.59   (1.8-2.4cm)PWd0.75   (0.7-1.1cm)

IVSs1.70   (0.8-1.2cm)LVDs2.55   (2.5-4.0cm)

FS (%) 24.9   %PWs1.34   (0.8-1.2cm)



M-Mode DIMENSIONS 

Left Atrium (MM)3.03   (2.5-4.0cm)IVSd1.49   (0.7-1.1cm)

Aortic Root2.92   (2.2-3.7cm)LVDd3.75   (4.0-5.6cm)

Aortic Cusp Exc.1.82   (1.5-2.0cm)PWd1.13   (0.7-1.1cm)

IVSs1.76   cmFS (%) 45   %

LVDs2.07   (2.0-3.8cm)PWs1.68   cm



Mitral Valve

E/A ratio0.0



TDI

E/Lateral E'0.0E/Medial E'0.0



Pulmonary Valve

PV Peak Hecbodub57.7cm/s



Tricuspid Valve

TR Peak Adonovlm014fl/sRAP CRNVTGAD99bcBuIA Peak Gr.33mmHg

[id number]



 LEFT VENTRICLE 

The left ventricle is normal size.

There is normal left ventricular wall thickness.

Left ventricle systolic function is normal.

The Ejection Fraction is 60-65%.

There is normal LV segmental wall motion.

Transmitral Doppler flow pattern is Grade I-abnormal relaxation 

pattern.



 RIGHT VENTRICLE 

The right ventricle is mildly to moderately dilated.

There is normal right ventricular wall thickness.

The right ventricular systolic function is normal.



 ATRIA 

The left atrium size is normal.

The right atrium size is normal.



 AORTIC VALVE 

The aortic valve is normal in structure.

No aortic regurgitation is present.

There is no aortic valvular stenosis. 



 MITRAL VALVE 

The mitral valve is normal in structure.

There is no evidence of mitral valve prolapse.

There is no mitral valve stenosis.

There is no mitral valve regurgitation noted.



 TRICUSPID VALVE 

The tricuspid valve is normal in structure.

There is mild tricuspid regurgitation.

Right ventricular systolic pressure is estimated at 42 mmHg. 

There is mild-moderate pulmonary hypertension.



 PULMONIC VALVE 

The pulmonary valve is normal in structure.

There is no pulmonic valvular regurgitation. 



 GREAT VESSELS 

The aortic root is normal in size.

The IVC is normal in size and collapses >50% with inspiration.



 PERICARDIAL EFFUSION 

The pericardium appears normal.



<Conclusion>

The left ventricle is normal size.

There is normal left ventricular wall thickness.

There is normal LV segmental wall motion.

Left ventricle systolic function is normal.

The Ejection Fraction is 60-65%.

Transmitral Doppler flow pattern is Grade I-abnormal relaxation 

pattern.

Right ventricular systolic pressure is estimated at 42 mmHg. 

There is mild-moderate pulmonary hypertension.

## 2018-01-30 NOTE — US
PROCEDURE:  Left lower extremity venous duplex Doppler. 



HISTORY:

calf pain







COMPARISON:

None available.



TECHNIQUE:

Common femoral, superficial femoral, popliteal and posterior tibial 

veins were evaluated. Flow was assessed with color Doppler, 

compressibility, assessment of phasic flow and augmentation response. 



FINDINGS:



COMMON FEMORAL VEIN:

Unremarkable.



SUPERFICIAL FEMORAL VEIN:

Unremarkable.



POPLITEAL VEIN:

Intraluminal echogenic material, incompressibility and lack of 

Doppler flow.



POSTERIOR TIBIAL VEIN:

Unremarkable.



OTHER FINDINGS:

Intraluminal echogenic material, incompressibility and lack of 

Doppler flow involving a calf vein.



IMPRESSION:

Occlusive deep venous thrombosis in the left popliteal and within a 

calf vein.

## 2018-01-30 NOTE — CARD
--------------- APPROVED REPORT --------------





EKG Measurement

Heart [id number]

MN [id number]

[id number]

[id number]

[id number]



<Conclusion>

Sinus tachycardia

Right atrial enlargement

Borderline ECG

## 2018-01-30 NOTE — CP.PCM.CON
History of Present Illness





- History of Present Illness


History of Present Illness: 





27 year old female with no past medical history, presented with shortness of 

breath and calf pain, found to have PE and DVT.  The patient reports to calf 

pain for about 2 weeks which she attributed to soreness from exercise.  She 

then began to experience shortness of breath and palpitations with exertion.  

She went to an urgent care center and was instructed to come to the hospital.  

In the ER a CT angio of the chest revealed PE and venous duplex of the LE 

revealed a left popliteal and calf DVT.  She was started on therapeutic Lovenox 

and sent to ICU for close monitoring.  She denies immobility and exercises 

regularly.  She does admit to oral contraceptive pill use.





Past medical history:  None





Past surgical history:  None





Family history:  Mom had to take unknown injection during pregnancy





Social history:  3 cigs daily, social ETOH, denies illicit drug use.





Allergies:  NKA





Review of systems:  All remaining review of systems including HEENT, 

cardiovacular, respiratory, gastrointestinal, genitourinary, musculoskeletal, 

dermatologic, neurologic, and psychiatric are negative unless mentioned in the 

HPI.





Past Patient History





- Past Medical History & Family History


Past Medical History?: No





- Past Social History


Alcohol: Occasional





- MUSCULOSKELETAL/RHEUMATOLOGICAL


Hx Falls: No





- PSYCHIATRIC


Hx Substance Use: No





- SURGICAL HISTORY


Hx Surgeries: No





- ANESTHESIA


Hx Anesthesia: No





Meds


Allergies/Adverse Reactions: 


 Allergies











Allergy/AdvReac Type Severity Reaction Status Date / Time


 


No Known Allergies Allergy   Verified 01/29/18 20:27














- Medications


Medications: 


 Current Medications





Enoxaparin Sodium (Lovenox)  70 mg SC Q12 NNEKA


   PRN Reason: Protocol


   Last Admin: 01/30/18 09:24 Dose:  70 mg











Physical Exam





- Head Exam


Head Exam: ATRAUMATIC





- Eye Exam


Eye Exam: Normal appearance





- ENT Exam


ENT Exam: Mucous Membranes Dry





- Respiratory Exam


Respiratory Exam: NORMAL BREATHING PATTERN





- Cardiovascular Exam


Cardiovascular Exam: +S1, +S2





- GI/Abdominal Exam


GI & Abdominal Exam: Normal Bowel Sounds





- Extremities Exam


Extremities exam: Positive for: pedal edema





- Neurological Exam


Neurological exam: Oriented x3





- Psychiatric Exam


Psychiatric exam: Normal Affect, Normal Mood





- Skin


Skin Exam: Warm





Results





- Vital Signs


Recent Vital Signs: 


 Last Vital Signs











Temp  97.4 F L  01/30/18 12:00


 


Pulse  105 H  01/30/18 12:00


 


Resp  12   01/30/18 12:00


 


BP  109/51 L  01/30/18 12:00


 


Pulse Ox  92 L  01/30/18 12:00














- Labs


Result Diagrams: 


 01/30/18 06:00





 01/30/18 04:30


Labs: 


 Laboratory Results - last 24 hr











  01/29/18 01/29/18 01/29/18





  20:47 20:47 20:47


 


WBC  13.8 H  


 


RBC  4.67  


 


Hgb  15.1  


 


Hct  44.8  


 


MCV  95.9  


 


MCH  32.2 H  


 


MCHC  33.6  


 


RDW  12.7  


 


Plt Count  201  


 


MPV  8.1  


 


Neut % (Auto)  74.9  


 


Lymph % (Auto)  14.5 L  


 


Mono % (Auto)  8.5  


 


Eos % (Auto)  1.6  


 


Baso % (Auto)  0.5  


 


Neut #  10.3 H  


 


Lymph #  2.0  


 


Mono #  1.2 H  


 


Eos #  0.2  


 


Baso #  0.1  


 


PT   


 


INR   


 


APTT   


 


D-Dimer, Quantitative    3969 H


 


Sodium   140 


 


Potassium   4.3 


 


Chloride   101 


 


Carbon Dioxide   27 


 


Anion Gap   16 


 


BUN   17 


 


Creatinine   1.2 


 


Est GFR ( Amer)   > 60 


 


Est GFR (Non-Af Amer)   55 


 


Random Glucose   101 


 


Calcium   9.6 


 


Total Bilirubin   0.6 


 


AST   143 H 


 


ALT   118 H 


 


Alkaline Phosphatase   74 


 


Troponin I   


 


NT-Pro-B Natriuret Pep   


 


Total Protein   8.1 


 


Albumin   4.2 


 


Globulin   3.9 


 


Albumin/Globulin Ratio   1.1 














  01/29/18 01/29/18 01/30/18





  22:15 22:44 04:30


 


WBC   


 


RBC   


 


Hgb   


 


Hct   


 


MCV   


 


MCH   


 


MCHC   


 


RDW   


 


Plt Count   


 


MPV   


 


Neut % (Auto)   


 


Lymph % (Auto)   


 


Mono % (Auto)   


 


Eos % (Auto)   


 


Baso % (Auto)   


 


Neut #   


 


Lymph #   


 


Mono #   


 


Eos #   


 


Baso #   


 


PT  11.3  


 


INR  1.0  


 


APTT  26.2  


 


D-Dimer, Quantitative   


 


Sodium    139


 


Potassium    3.9


 


Chloride    104


 


Carbon Dioxide    25


 


Anion Gap    14


 


BUN    15


 


Creatinine    1.1


 


Est GFR ( Amer)    > 60


 


Est GFR (Non-Af Amer)    > 60


 


Random Glucose    103


 


Calcium    9.3


 


Total Bilirubin   


 


AST   


 


ALT   


 


Alkaline Phosphatase   


 


Troponin I   0.0860 


 


NT-Pro-B Natriuret Pep   1700 H 


 


Total Protein   


 


Albumin   


 


Globulin   


 


Albumin/Globulin Ratio   














  01/30/18





  06:00


 


WBC  12.9 H


 


RBC  4.38


 


Hgb  14.1


 


Hct  42.0


 


MCV  95.8


 


MCH  32.2 H


 


MCHC  33.6


 


RDW  12.5


 


Plt Count  199


 


MPV 


 


Neut % (Auto) 


 


Lymph % (Auto) 


 


Mono % (Auto) 


 


Eos % (Auto) 


 


Baso % (Auto) 


 


Neut # 


 


Lymph # 


 


Mono # 


 


Eos # 


 


Baso # 


 


PT 


 


INR 


 


APTT 


 


D-Dimer, Quantitative 


 


Sodium 


 


Potassium 


 


Chloride 


 


Carbon Dioxide 


 


Anion Gap 


 


BUN 


 


Creatinine 


 


Est GFR ( Amer) 


 


Est GFR (Non-Af Amer) 


 


Random Glucose 


 


Calcium 


 


Total Bilirubin 


 


AST 


 


ALT 


 


Alkaline Phosphatase 


 


Troponin I 


 


NT-Pro-B Natriuret Pep 


 


Total Protein 


 


Albumin 


 


Globulin 


 


Albumin/Globulin Ratio 














Assessment & Plan


(1) Pulmonary embolism


Assessment and Plan: 


likely provoked from OCP - instructed patient to stop


on therapeutic anticoagulation; minimum duration of 3 months


inherited thrombophilia w/u sent


for echo today to evaluate for right heart strain


discussed IVC filter; patient deferred this


Status: Acute   





(2) Deep vein thrombosis (DVT)


Assessment and Plan: 


provoked from OCP


on therapeutic anticoagulation for minimum of 3 months





Status: Acute   





(3) Leukocytosis


Assessment and Plan: 


likely reactive





Thank you for this interesting consult.


Status: Acute

## 2018-01-31 LAB
ALBUMIN SERPL-MCNC: 3.6 G/DL (ref 3.5–5)
ALBUMIN/GLOB SERPL: 1 {RATIO} (ref 1–2.1)
ALT SERPL-CCNC: 82 U/L (ref 9–52)
AST SERPL-CCNC: 43 U/L (ref 14–36)
BUN SERPL-MCNC: 19 MG/DL (ref 7–17)
CALCIUM SERPL-MCNC: 9.2 MG/DL (ref 8.4–10.2)
ERYTHROCYTE [DISTWIDTH] IN BLOOD BY AUTOMATED COUNT: 12.6 % (ref 11.5–14.5)
GFR NON-AFRICAN AMERICAN: 50
HGB BLD-MCNC: 14.9 G/DL (ref 12–16)
MCH RBC QN AUTO: 32.4 PG (ref 27–31)
MCHC RBC AUTO-ENTMCNC: 33.8 G/DL (ref 33–37)
MCV RBC AUTO: 95.9 FL (ref 81–99)
PLATELET # BLD: 199 K/UL (ref 130–400)
RBC # BLD AUTO: 4.58 MIL/UL (ref 3.8–5.2)
WBC # BLD AUTO: 10.4 K/UL (ref 4.8–10.8)

## 2018-01-31 RX ADMIN — ENOXAPARIN SODIUM SCH MG: 80 INJECTION SUBCUTANEOUS at 08:33

## 2018-01-31 NOTE — PN
DATE:  01/31/2018



SUBJECTIVE:  The patient is seen and examined.  Interim events noted. 

Consults noted and appreciated.  Hematology/Oncology interventions noted

and appreciated.  The patient remains in the Intensive Care Unit.  The

patient feels better.  Chest pain and shortness of breath improved.  No new

complaint.  No leg pain.



PHYSICAL EXAMINATION:

GENERAL:  The patient is in no acute distress.

VITAL SIGNS:  Stable.  No orthostatic changes.

HEART:  S1 and S2, normal and regular.

LUNGS:  Good bilateral air entry.

ABDOMEN:  Soft, nontender.  No organomegaly.  No fluid.  Bowel sounds are

plus and normal.

EXTREMITIES:  No edema.  No calf swelling.  No tenderness.  No acute

ischemia.

CENTRAL NERVOUS SYSTEM:  Essentially unchanged.



DIAGNOSTIC DATA:  Available diagnostic data reviewed.  Hypercoagulable

status workup is pending.



ASSESSMENT AND PLAN:  Overall, the patient is clinically stable and

improving.  Plan as ordered.



__________________________________________

Sunny Comer MD

 



DD:  01/31/2018 8:01:00

DT:  01/31/2018 8:38:13

Job # [id number]

## 2018-01-31 NOTE — CP.PCM.CON
History of Present Illness





- History of Present Illness


History of Present Illness: 





26 y/o with history of tobacco use and OC use, who presents with Pulmonary 

Emboli. 





No PMHx





Mild smoker.





Neg long trips on flights nor ground transportation. 





No meds except for OC. 





NKDA





Vss Stable except for mild tachycardia. 





O2 Sat 98 on RA





Lungs clear. 





a/p





Acute PE / DVT





Plan. Cont O2 Sat > 90 %. Patient is saturating well on RA. 


W/U for hypercoagulable state. 


Cont AC, would start Oral agents and continue for 9 months. 


Repeat Echo in one month as outpatient. 


signing out of case, please reconsult prn. 





Past Patient History





- Past Medical History & Family History


Past Medical History?: No





- Past Social History


Alcohol: Occasional





- MUSCULOSKELETAL/RHEUMATOLOGICAL


Hx Falls: No





- PSYCHIATRIC


Hx Substance Use: No





- SURGICAL HISTORY


Hx Surgeries: No





- ANESTHESIA


Hx Anesthesia: No





Meds


Allergies/Adverse Reactions: 


 Allergies











Allergy/AdvReac Type Severity Reaction Status Date / Time


 


No Known Allergies Allergy   Verified 01/29/18 20:27














- Medications


Medications: 


 Current Medications





Apixaban (Eliquis)  10 mg PO BID NNEKA


   PRN Reason: Protocol


   Last Admin: 01/31/18 21:23 Dose:  10 mg











Results





- Vital Signs


Recent Vital Signs: 


 Last Vital Signs











Temp  98.7 F   01/31/18 20:00


 


Pulse  100 H  01/31/18 20:00


 


Resp  19   01/31/18 20:00


 


BP  125/75   01/31/18 20:00


 


Pulse Ox  98   01/31/18 20:00














- Labs


Result Diagrams: 


 01/31/18 04:30





 01/31/18 04:30


Labs: 


 Laboratory Results - last 24 hr











  01/31/18 01/31/18





  04:30 04:30


 


WBC  10.4 


 


RBC  4.58 


 


Hgb  14.9 


 


Hct  43.9 


 


MCV  95.9 


 


MCH  32.4 H 


 


MCHC  33.8 


 


RDW  12.6 


 


Plt Count  199 


 


Sodium   139


 


Potassium   4.2


 


Chloride   102


 


Carbon Dioxide   28


 


Anion Gap   13


 


BUN   19 H


 


Creatinine   1.3 H


 


Est GFR ( Amer)   > 60


 


Est GFR (Non-Af Amer)   50


 


Random Glucose   94


 


Calcium   9.2


 


Total Bilirubin   0.6


 


AST   43 H D


 


ALT   82 H D


 


Alkaline Phosphatase   61


 


Total Protein   7.2


 


Albumin   3.6


 


Globulin   3.6


 


Albumin/Globulin Ratio   1.0

## 2018-01-31 NOTE — CP.PCM.PN
Subjective





- Date & Time of Evaluation


Date of Evaluation: 01/31/18


Time of Evaluation: 18:40





- Subjective


Subjective: 





No complaints.





Objective





- Vital Signs/Intake and Output


Vital Signs (last 24 hours): 


 











Temp Pulse Resp BP Pulse Ox


 


 98.7 F   100 H  19   125/75   98 


 


 01/31/18 20:00  01/31/18 20:00  01/31/18 20:00  01/31/18 20:00  01/31/18 20:00








Intake and Output: 


 











 01/31/18 02/01/18





 18:59 06:59


 


Intake Total 400 


 


Output Total 700 


 


Balance -300 














- Medications


Medications: 


 Current Medications





Apixaban (Eliquis)  10 mg PO BID NNEKA


   PRN Reason: Protocol


   Last Admin: 01/31/18 21:23 Dose:  10 mg











- Labs


Labs: 


 





 01/31/18 04:30 





 01/31/18 04:30 





 











PT  11.3 Seconds (9.8-13.1)   01/29/18  22:15    


 


INR  1.0  (0.9-1.2)   01/29/18  22:15    


 


APTT  26.2 Seconds (25.6-37.1)   01/29/18  22:15    














- Head Exam


Head Exam: ATRAUMATIC





- Eye Exam


Eye Exam: Normal appearance





- ENT Exam


ENT Exam: Mucous Membranes Dry





- Respiratory Exam


Respiratory Exam: NORMAL BREATHING PATTERN





- Cardiovascular Exam


Cardiovascular Exam: +S1, +S2





- GI/Abdominal Exam


GI & Abdominal Exam: Normal Bowel Sounds





- Extremities Exam


Extremities Exam: Normal Inspection





Assessment and Plan


(1) Pulmonary embolism


Assessment & Plan: 


provoked from OCP


inherited thrombophilia w/u sent


therapeutic anticoagulation


Status: Acute   





(2) Deep vein thrombosis (DVT)


Assessment & Plan: 


provoked from OCP


on therapeutic anticoagulation


Status: Acute   





(3) Leukocytosis


Status: Acute

## 2018-02-01 VITALS
DIASTOLIC BLOOD PRESSURE: 67 MMHG | OXYGEN SATURATION: 99 % | RESPIRATION RATE: 18 BRPM | HEART RATE: 87 BPM | SYSTOLIC BLOOD PRESSURE: 103 MMHG | TEMPERATURE: 97.9 F

## 2018-02-01 LAB
BUN SERPL-MCNC: 14 MG/DL (ref 7–17)
CALCIUM SERPL-MCNC: 9.2 MG/DL (ref 8.4–10.2)
CARDIOLIPIN IGA SER IA-ACNC: <11 APL (ref ?–11)
CARDIOLIPIN IGG SER IA-ACNC: <14 GPL (ref ?–14)
ERYTHROCYTE [DISTWIDTH] IN BLOOD BY AUTOMATED COUNT: 12.1 % (ref 11.5–14.5)
GFR NON-AFRICAN AMERICAN: > 60
HGB BLD-MCNC: 14.6 G/DL (ref 12–16)
MCH RBC QN AUTO: 32.6 PG (ref 27–31)
MCHC RBC AUTO-ENTMCNC: 34.4 G/DL (ref 33–37)
MCV RBC AUTO: 94.9 FL (ref 81–99)
PLATELET # BLD: 209 K/UL (ref 130–400)
RBC # BLD AUTO: 4.46 MIL/UL (ref 3.8–5.2)
WBC # BLD AUTO: 8.6 K/UL (ref 4.8–10.8)

## 2018-02-01 NOTE — CP.PCM.PN
Subjective





- Date & Time of Evaluation


Date of Evaluation: 02/01/18


Time of Evaluation: 12:00





- Subjective


Subjective: 





Feeling better





Objective





- Vital Signs/Intake and Output


Vital Signs (last 24 hours): 


 











Temp Pulse Resp BP Pulse Ox


 


 98.2 F   75   20   105/57 L  97 


 


 02/01/18 08:21  02/01/18 08:21  02/01/18 08:21  02/01/18 08:21  02/01/18 08:21











- Medications


Medications: 


 Current Medications





Apixaban (Eliquis)  10 mg PO BID NNEKA


   PRN Reason: Protocol


   Last Admin: 02/01/18 09:03 Dose:  10 mg











- Labs


Labs: 


 





 02/01/18 05:40 





 02/01/18 05:40 





 











PT  11.3 Seconds (9.8-13.1)   01/29/18  22:15    


 


INR  1.0  (0.9-1.2)   01/29/18  22:15    


 


APTT  26.2 Seconds (25.6-37.1)   01/29/18  22:15    














- Head Exam


Head Exam: ATRAUMATIC





- Eye Exam


Eye Exam: Normal appearance





- ENT Exam


ENT Exam: Mucous Membranes Dry





- Respiratory Exam


Respiratory Exam: NORMAL BREATHING PATTERN





- Cardiovascular Exam


Cardiovascular Exam: +S1, +S2





- GI/Abdominal Exam


GI & Abdominal Exam: Normal Bowel Sounds





- Extremities Exam


Extremities Exam: Normal Inspection





Assessment and Plan


(1) Pulmonary embolism


Assessment & Plan: 


suspect provoked from OCP; stopped


inherited thrombophilia w/u sent


on Eliquis


outpatient f/u


Status: Acute   





(2) Deep vein thrombosis (DVT)


Assessment & Plan: 


provoked from OCP


on Eliquis


Status: Acute   





(3) Leukocytosis


Assessment & Plan: 


resolved


Status: Acute

## 2018-02-01 NOTE — CP.PCM.DIS
Provider





- Provider


Date of Admission: 


01/29/18 22:36





Attending physician: 


Sunny Comer MD





Time Spent in preparation of Discharge (in minutes): 30





Diagnosis





- Discharge Diagnosis


(1) Pulmonary embolism


Status: Acute   





Hospital Course





- Lab Results


Lab Results: 


 Micro Results





01/30/18 08:25   Nose   MRSA Culture (Admit) - Final


                            MRSA NOT DETECTED





 Most Recent Lab Values











WBC  8.6 K/uL (4.8-10.8)   02/01/18  05:40    


 


RBC  4.46 Mil/uL (3.80-5.20)   02/01/18  05:40    


 


Hgb  14.6 g/dL (12.0-16.0)   02/01/18  05:40    


 


Hct  42.3 % (34.0-47.0)   02/01/18  05:40    


 


MCV  94.9 fl (81.0-99.0)   02/01/18  05:40    


 


MCH  32.6 pg (27.0-31.0)  H  02/01/18  05:40    


 


MCHC  34.4 g/dL (33.0-37.0)   02/01/18  05:40    


 


RDW  12.1 % (11.5-14.5)   02/01/18  05:40    


 


Plt Count  209 K/uL (130-400)   02/01/18  05:40    


 


MPV  8.1 fl (7.2-11.7)   01/29/18  20:47    


 


Neut % (Auto)  74.9 % (50.0-75.0)   01/29/18  20:47    


 


Lymph % (Auto)  14.5 % (20.0-40.0)  L  01/29/18  20:47    


 


Mono % (Auto)  8.5 % (0.0-10.0)   01/29/18  20:47    


 


Eos % (Auto)  1.6 % (0.0-4.0)   01/29/18  20:47    


 


Baso % (Auto)  0.5 % (0.0-2.0)   01/29/18  20:47    


 


Neut #  10.3 K/uL (1.8-7.0)  H  01/29/18  20:47    


 


Lymph #  2.0 K/uL (1.0-4.3)   01/29/18  20:47    


 


Mono #  1.2 K/uL (0.0-0.8)  H  01/29/18  20:47    


 


Eos #  0.2 K/uL (0.0-0.7)   01/29/18  20:47    


 


Baso #  0.1 K/uL (0.0-0.2)   01/29/18  20:47    


 


PT  11.3 Seconds (9.8-13.1)   01/29/18  22:15    


 


INR  1.0  (0.9-1.2)   01/29/18  22:15    


 


APTT  26.2 Seconds (25.6-37.1)   01/29/18  22:15    


 


D-Dimer, Quantitative  3969 ng/mlDDU (0-230)  H  01/29/18  20:47    


 


Protein C Activity  79 % ()   01/30/18  04:45    


 


Antithrombin III Activ  86 % activity ()   01/30/18  04:45    


 


Sodium  139 mmol/l (132-148)   02/01/18  05:40    


 


Potassium  4.6 MMOL/L (3.6-5.0)   02/01/18  05:40    


 


Chloride  103 mmol/L ()   02/01/18  05:40    


 


Carbon Dioxide  30 mmol/L (22-30)   02/01/18  05:40    


 


Anion Gap  11  (10-20)   02/01/18  05:40    


 


BUN  14 mg/dl (7-17)   02/01/18  05:40    


 


Creatinine  1.1 mg/dl (0.7-1.2)   02/01/18  05:40    


 


Est GFR ( Amer)  > 60   02/01/18  05:40    


 


Est GFR (Non-Af Amer)  > 60   02/01/18  05:40    


 


Random Glucose  94 mg/dL ()   02/01/18  05:40    


 


Calcium  9.2 mg/dL (8.4-10.2)   02/01/18  05:40    


 


Total Bilirubin  0.6 mg/dl (0.2-1.3)   01/31/18  04:30    


 


AST  43 U/L (14-36)  H D 01/31/18  04:30    


 


ALT  82 U/L (9-52)  H D 01/31/18  04:30    


 


Alkaline Phosphatase  61 U/L ()   01/31/18  04:30    


 


Troponin I  0.0860 ng/mL (0.00-0.120)   01/29/18  22:44    


 


NT-Pro-B Natriuret Pep  1700 pg/ml (0-450)  H  01/29/18  22:44    


 


Total Protein  7.2 G/DL (6.3-8.2)   01/31/18  04:30    


 


Albumin  3.6 g/dL (3.5-5.0)   01/31/18  04:30    


 


Globulin  3.6 gm/dL (2.2-3.9)   01/31/18  04:30    


 


Albumin/Globulin Ratio  1.0  (1.0-2.1)   01/31/18  04:30    














- Hospital Course


Hospital Course: 





1) DVT w/ B/L pulmonary embolm


26 YO F who came into ER with c/o palpatations and SOB w/ left calf pain was 

worked up in the ER she was noted to have D Dimer of 3969


- provoked from OCP


- Patient was noted to have a D Dimer of 3969. CT angio was done which showed B/

L Pulmonary embolism. Intially was given therapeutic lovenox. 


- Continue with Eloquis 10mg BID x 7 days,  then 5mg BID ( continue for 9 

months anticoagulation treatment)


-Hypercoaguability workup ordered:  factor V leiden, prothrombin gene, 

antiphospholipid sdx, MTHFR


- Protien C and Antithrombin III WNL


- Echo: LVEF: 60-65 %, Right vent mild to moderatly dilated





- Patient has been cleared by Pulm and Heme onc. Saturating well on room air. 

Ambulating without difficulty. Denies chest pain , SOB. 





Discharge Exam





- Head Exam


Head Exam: NORMAL INSPECTION





- Eye Exam


Eye Exam: Normal appearance





- Respiratory Exam


Respiratory Exam: Clear to PA & Lateral, NORMAL BREATHING PATTERN.  absent: 

Rales, Rhonchi





- Cardiovascular Exam


Cardiovascular Exam: REGULAR RHYTHM, +S1, +S2





- GI/Abdominal Exam


GI & Abdominal Exam: Normal Bowel Sounds, Soft.  absent: Tenderness





- Neurological Exam


Neurological exam: Alert, CN II-XII Intact, Oriented x3





- Skin


Skin Exam: Normal Color, Warm





Discharge Plan





- Discharge Medications


Prescriptions: 


Apixaban [Eliquis] 5 mg PO BID #28 tablet


Apixaban [Eliquis] 10 mg PO BID #14 tab





- Follow Up Plan


Condition: GOOD


Disposition: HOME/ ROUTINE


Instructions:  Deep Venous Thrombosis (DC)


Additional Instructions: 


Follow up with Dr. Comer on Monday between 11am and 12 am.


Referrals: 


Jerry Gomze MD [Staff Provider] - 


Endy Mcdermott MD [Staff Provider] -

## 2018-02-01 NOTE — CP.PCM.PN
Subjective





- Date & Time of Evaluation


Date of Evaluation: 02/01/18


Time of Evaluation: 07:52





- Subjective


Subjective: 





28 YO F who was admitted for a PE is seen at bedside with Dr Comer. Patient 

appears to be comfortably resting and is doing well. She has been ambulating 

and denies any dificulties. Patient  was given opportunity to ask all questions,

and all questions were answered. 





Objective





- Vital Signs/Intake and Output


Vital Signs (last 24 hours): 


 











Temp Pulse Resp BP Pulse Ox


 


 97.2 F L  90   20   122/79   96 


 


 02/01/18 00:26  02/01/18 00:26  02/01/18 00:26  02/01/18 00:26  02/01/18 00:26











- Medications


Medications: 


 Current Medications





Apixaban (Eliquis)  10 mg PO BID NNEKA


   PRN Reason: Protocol


   Last Admin: 01/31/18 21:23 Dose:  10 mg











- Labs


Labs: 


 





 02/01/18 05:40 





 02/01/18 05:40 





 











PT  11.3 Seconds (9.8-13.1)   01/29/18  22:15    


 


INR  1.0  (0.9-1.2)   01/29/18  22:15    


 


APTT  26.2 Seconds (25.6-37.1)   01/29/18  22:15    














- Constitutional


Appears: No Acute Distress





- Head Exam


Head Exam: NORMAL INSPECTION





- Eye Exam


Eye Exam: Normal appearance





- Respiratory Exam


Respiratory Exam: Clear to Ausculation Bilateral.  absent: Rhonchi, Wheezes, 

Respiratory Distress





- Cardiovascular Exam


Cardiovascular Exam: REGULAR RHYTHM, +S1, +S2





- GI/Abdominal Exam


GI & Abdominal Exam: Soft, Normal Bowel Sounds.  absent: Tenderness





- Neurological Exam


Neurological Exam: Alert, Awake, CN II-XII Intact, Normal Gait, Oriented x3





Assessment and Plan





- Assessment and Plan (Free Text)


Assessment: 


1) DVT w/ B/L pulmonary embolm


- provoked from OCP


- Continue with Eloquis 10mg BID x 7 days,  then 5mg BID ( continue for 9 

months anticoagulation treatment)


-Hypercoaguability workup ordered:  factor V leiden, prothrombin gene, 

antiphospholipid sdx, MTHFR


- Protien C and Antithrombin III WNL


-heme onc consult appreciated


- Echo: LVEF: 60-65 %, Right vent mild to moderatly dilated





2) Transaminitis: trending down


- 143/118 trended down to 43/82

## 2018-02-02 LAB
B2 GLYCOPROT1 IGA SER-ACNC: <9 SAU (ref ?–20)
B2 GLYCOPROT1 IGG SER-ACNC: <9 SGU (ref ?–20)
B2 GLYCOPROT1 IGM SER-ACNC: <9 SMU (ref ?–20)
CARDIOLIPIN IGM SER IA-ACNC: <12 MPL (ref ?–12)